# Patient Record
Sex: FEMALE | Race: WHITE | URBAN - METROPOLITAN AREA
[De-identification: names, ages, dates, MRNs, and addresses within clinical notes are randomized per-mention and may not be internally consistent; named-entity substitution may affect disease eponyms.]

---

## 2019-02-14 ENCOUNTER — OFFICE (OUTPATIENT)
Dept: URBAN - METROPOLITAN AREA CLINIC 78 | Facility: CLINIC | Age: 84
End: 2019-02-14
Payer: COMMERCIAL

## 2019-02-14 VITALS
DIASTOLIC BLOOD PRESSURE: 86 MMHG | HEART RATE: 80 BPM | TEMPERATURE: 97.6 F | HEIGHT: 59 IN | SYSTOLIC BLOOD PRESSURE: 141 MMHG | WEIGHT: 94 LBS

## 2019-02-14 DIAGNOSIS — K64.0 FIRST DEGREE HEMORRHOIDS: ICD-10-CM

## 2019-02-14 DIAGNOSIS — K62.5 HEMORRHAGE OF ANUS AND RECTUM: ICD-10-CM

## 2019-02-14 PROCEDURE — 99203 OFFICE O/P NEW LOW 30 MIN: CPT

## 2019-05-14 ENCOUNTER — OFFICE (OUTPATIENT)
Dept: URBAN - METROPOLITAN AREA CLINIC 78 | Facility: CLINIC | Age: 84
End: 2019-05-14
Payer: COMMERCIAL

## 2019-05-14 VITALS
TEMPERATURE: 98.4 F | HEART RATE: 76 BPM | WEIGHT: 100 LBS | SYSTOLIC BLOOD PRESSURE: 164 MMHG | HEIGHT: 59 IN | DIASTOLIC BLOOD PRESSURE: 99 MMHG

## 2019-05-14 DIAGNOSIS — K62.5 HEMORRHAGE OF ANUS AND RECTUM: ICD-10-CM

## 2019-05-14 DIAGNOSIS — K64.0 FIRST DEGREE HEMORRHOIDS: ICD-10-CM

## 2019-05-14 PROCEDURE — 99214 OFFICE O/P EST MOD 30 MIN: CPT

## 2019-05-14 NOTE — SERVICEHPINOTES
PTEERSON ALVARADO   is a   85  female who presents with rectal bleeding. The patient is accompanied by her friend.  Has a memory loss issue. Seen by Dr. Mcduffie in Feb, thought to be hemorrhoid bleeding. Still having a small amount of blood in stool. The blood is mostly after wipe, intermittent. Denies rectal pain, itchiness or discharge. BRMoves bowel every day, once a day on BSS type 4. Occasionally type  2. Occasional abdominal pain. BRDenies further weight loss since Feb.  BRDenies nausea, vomiting, acid reflux or dyspepsia. BROccasional dysphagia with pills and foods. BRTakes Metamucil as needed. Last colonoscopy 04/03/14 showed small hemorrhoids, significant diverticulosis in the sigmoid colon and benign polyp. BR